# Patient Record
(demographics unavailable — no encounter records)

---

## 2024-11-05 NOTE — ASSESSMENT
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 - following Ortho - gabapentin cont - Saw Santo Barnard  #PreDM, repeat a1c wnl  - Discussed lifestyle modifications  #Hemorrhoids  #HCM - has PCP - qualifies for statin , declined - colonoscopy  , polyps - wife  , has grandparents - no covid vaccine- declines , had COVID - PPNA - due - declines- discuss again next visit - scared of needles - due for pneumonia shot- declined - works as [employer] - ordered CT lung ca screening - no new sexual partners  I have personally reviewed all the available charts, laboratory data, radiology 32 min spent counseling patient. [HIV Education] : HIV Education [Smoking Cessation] : smoking cessation

## 2024-12-09 NOTE — ASSESSMENT
[FreeTextEntry1] : 55-year old with PMH of L eye operations, knee surgery, right pneumothorax, spinal surgery, HIV presents to clinic for follow up  #Neck pain 2/2 cervical spondylosis - Check cervical X-ray showing severe cervical spondylosis - Continue gabapentin, monitor for side effects   #HIV - follows ID - HIV viral load not detectable - c/w current meds  # Active smoking  - Tried to quit previously, didn't work, not willing to try at the moment   # Foreign sensation in left eye # Blurring of vision - Ophthalmology referral   #HCM - routine lab work reviewed - GI referral declined previously, discussed again, he will think about it  - RTC in 6 months

## 2024-12-09 NOTE — PHYSICAL EXAM
[de-identified] : Spinal tenderness in cervical region [de-identified] : Right elbow swelling, soft

## 2024-12-09 NOTE — REVIEW OF SYSTEMS
[Fever] : no fever [Chills] : no chills [Sore Throat] : no sore throat [Chest Pain] : no chest pain [Palpitations] : no palpitations [Shortness Of Breath] : no shortness of breath [Wheezing] : no wheezing [Cough] : no cough [Abdominal Pain] : no abdominal pain [Nausea] : no nausea [Constipation] : no constipation [Diarrhea] : no diarrhea [Vomiting] : no vomiting [Dysuria] : no dysuria [Incontinence] : no incontinence [Itching] : no itching

## 2024-12-09 NOTE — HISTORY OF PRESENT ILLNESS
[de-identified] : 55-year old with PMH of L eye operations, knee surgery, right pneumothorax, spinal surgery, HIV presents to clinic for follow up.   He has no active complaints  [FreeTextEntry1] : Follow up

## 2025-05-20 NOTE — ASSESSMENT
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 - following Ortho - gabapentin cont - Saw Santo Barnard  #PreDM, repeat a1c wnl - Discussed lifestyle modifications  #Hemorrhoids  #HCM - Wife passed away of AIDS 7/2010 - has PCP - qualifies for statin , declined - colonoscopy 2020 , polyps - no covid vaccine- declines , had COVID - PPNA 2012- due - declines- discuss again next visit - scared of needles - due for pneumonia shot- declined - works as  - ordered CT lung ca screening - no new sexual partners  I have personally reviewed all the available charts, laboratory data, radiology 32 min spent counseling patient.